# Patient Record
Sex: FEMALE | Race: WHITE | NOT HISPANIC OR LATINO | Employment: UNEMPLOYED | URBAN - METROPOLITAN AREA
[De-identification: names, ages, dates, MRNs, and addresses within clinical notes are randomized per-mention and may not be internally consistent; named-entity substitution may affect disease eponyms.]

---

## 2020-04-22 ENCOUNTER — TELEPHONE (OUTPATIENT)
Dept: OBSTETRICS AND GYNECOLOGY | Facility: CLINIC | Age: 35
End: 2020-04-22

## 2020-04-22 NOTE — TELEPHONE ENCOUNTER
----- Message from Kayla Bean sent at 4/22/2020  2:37 PM CDT -----  Contact: pt   Pt calling because she want to find out who it was dr. Porter referred her to for ortho. Please give patient a call to discuss. [phone]        Thanks   Kayla Bean

## 2020-04-22 NOTE — TELEPHONE ENCOUNTER
PT C/O OF CARPAL TUNNEL, WANTS TO SEE ORTHO. DOESN'T REMEMBER WHO SHE SAW DURING PREGNANCY FOR THIS ISSUE.  WHO DOES DR ZAPATA RECOMMEND? PLEASE ADVISE

## 2020-05-18 ENCOUNTER — OFFICE VISIT (OUTPATIENT)
Dept: ORTHOPEDICS | Facility: CLINIC | Age: 35
End: 2020-05-18
Payer: COMMERCIAL

## 2020-05-18 VITALS — BODY MASS INDEX: 35.28 KG/M2 | HEIGHT: 60 IN | TEMPERATURE: 98 F | WEIGHT: 179.69 LBS

## 2020-05-18 DIAGNOSIS — M72.2 PLANTAR FASCIITIS OF RIGHT FOOT: ICD-10-CM

## 2020-05-18 DIAGNOSIS — G56.01 RIGHT CARPAL TUNNEL SYNDROME: Primary | ICD-10-CM

## 2020-05-18 PROCEDURE — 20526 CARPAL TUNNEL: R RADIOCARPAL: ICD-10-PCS | Mod: 59,RT,S$GLB, | Performed by: ORTHOPAEDIC SURGERY

## 2020-05-18 PROCEDURE — 20526 THER INJECTION CARP TUNNEL: CPT | Mod: 59,RT,S$GLB, | Performed by: ORTHOPAEDIC SURGERY

## 2020-05-18 PROCEDURE — 99203 OFFICE O/P NEW LOW 30 MIN: CPT | Mod: 25,S$GLB,, | Performed by: ORTHOPAEDIC SURGERY

## 2020-05-18 PROCEDURE — 20551 NJX 1 TENDON ORIGIN/INSJ: CPT | Mod: 51,RT,S$GLB, | Performed by: ORTHOPAEDIC SURGERY

## 2020-05-18 PROCEDURE — 99203 PR OFFICE/OUTPT VISIT, NEW, LEVL III, 30-44 MIN: ICD-10-PCS | Mod: 25,S$GLB,, | Performed by: ORTHOPAEDIC SURGERY

## 2020-05-18 PROCEDURE — 20551 TENDON ORIGIN: ICD-10-PCS | Mod: 51,RT,S$GLB, | Performed by: ORTHOPAEDIC SURGERY

## 2020-05-18 RX ORDER — CLONAZEPAM 1 MG/1
TABLET ORAL
COMMUNITY
Start: 2020-04-22

## 2020-05-18 NOTE — PROGRESS NOTES
Subjective:      Patient ID: Kayla New is a 34 y.o. female.    Chief Complaint: Pain of the Right Hand and Pain of the Right Foot    HPI 34-year-old lady who comes in with numbness in the median nerve distribution of the right hand that comes and goes.  She wears a splint at night.  The carpal tunnel was injected about 4 years ago during a pregnancy.    Secondary complaint is of right heel pain which has been going on over a year.  She has been injected once in the past and wears custom orthotics    Review of Systems   Constitution: Negative for fever and weight loss.   Cardiovascular: Negative for chest pain and leg swelling.   Musculoskeletal: Negative for arthritis, joint pain, joint swelling, muscle weakness and stiffness.   Gastrointestinal: Negative for change in bowel habit.   Genitourinary: Negative for bladder incontinence and hematuria.   Neurological: Positive for numbness, paresthesias and sensory change. Negative for focal weakness.         Objective:      Examination of the right hand shows active and passive range of motion of the right wrist is normal.  She has decreased sensation in the median nerve distribution.  She has a positive carpal compression test.    Examination of the right foot shows tenderness of the medial tuberosity of the os calcis and the plantar fascia insertion.  She has a negative Tinel's over the posterior tibial nerve      Ortho/SPM Exam            Assessment:       Encounter Diagnoses   Name Primary?    Right carpal tunnel syndrome Yes    Plantar fasciitis of right foot           Plan:       Kayla was seen today for pain and pain.    Diagnoses and all orders for this visit:    Right carpal tunnel syndrome    Plantar fasciitis of right foot     The right carpal tunnel and plantar fascia insertion are both injected today.  Return p.r.n.

## 2020-12-29 ENCOUNTER — OFFICE VISIT (OUTPATIENT)
Dept: ORTHOPEDICS | Facility: CLINIC | Age: 35
End: 2020-12-29
Payer: COMMERCIAL

## 2020-12-29 DIAGNOSIS — M72.2 PLANTAR FASCIITIS OF RIGHT FOOT: Primary | ICD-10-CM

## 2020-12-29 PROCEDURE — 99212 PR OFFICE/OUTPT VISIT, EST, LEVL II, 10-19 MIN: ICD-10-PCS | Mod: 25,S$GLB,, | Performed by: ORTHOPAEDIC SURGERY

## 2020-12-29 PROCEDURE — 20551 TENDON ORIGIN: ICD-10-PCS | Mod: RT,S$GLB,, | Performed by: ORTHOPAEDIC SURGERY

## 2020-12-29 PROCEDURE — 20551 NJX 1 TENDON ORIGIN/INSJ: CPT | Mod: RT,S$GLB,, | Performed by: ORTHOPAEDIC SURGERY

## 2020-12-29 PROCEDURE — 99212 OFFICE O/P EST SF 10 MIN: CPT | Mod: 25,S$GLB,, | Performed by: ORTHOPAEDIC SURGERY

## 2020-12-29 RX ORDER — SODIUM, POTASSIUM,MAG SULFATES 17.5-3.13G
SOLUTION, RECONSTITUTED, ORAL ORAL
COMMUNITY
Start: 2020-12-22 | End: 2022-06-22

## 2020-12-29 NOTE — PROCEDURES
Tendon Origin    Date/Time: 12/29/2020 1:15 PM  Performed by: Tom Abbasi MD  Authorized by: Tom Abbasi MD     Consent Done?:  Yes (Verbal)  Location: Right plantar fascia insertion.  Prep: patient was prepped and draped in usual sterile fashion    Needle size:  25 G  Approach:  Anteromedial  Medications:  10 mg triamcinolone acetonide 10 mg/mL  Patient tolerance:  Patient tolerated the procedure well with no immediate complications

## 2020-12-29 NOTE — PROGRESS NOTES
Subjective:      Patient ID: Kayla New is a 35 y.o. female.    Chief Complaint: Pain of the Right Foot    HPI 35-year-old lady comes in with right heel pain.  She had previously been seen for plantar fasciitis.    Review of Systems   Constitution: Negative for fever and weight loss.   Cardiovascular: Negative for chest pain and leg swelling.   Musculoskeletal: Negative for arthritis, joint pain, joint swelling, muscle weakness and stiffness.   Gastrointestinal: Negative for change in bowel habit.   Genitourinary: Negative for bladder incontinence and hematuria.   Neurological: Negative for focal weakness, numbness, paresthesias and sensory change.         Objective:      Active and passive range of motion of the right foot and ankle is normal.  She has normal sensation.  She has a negative Tinel's over the posterior tibial nerve.  She is tender with palpation of the medial tuberosity of the os calcis and the plantar fascia insertion.      Ortho/SPM Exam            Assessment:       Encounter Diagnosis   Name Primary?    Plantar fasciitis of right foot Yes          Plan:       Kayla was seen today for pain.    Diagnoses and all orders for this visit:    Plantar fasciitis of right foot      Plantar fascia insertion is injected today.  Return p.r.n.

## 2021-04-20 ENCOUNTER — OFFICE VISIT (OUTPATIENT)
Dept: OBSTETRICS AND GYNECOLOGY | Facility: CLINIC | Age: 36
End: 2021-04-20
Payer: COMMERCIAL

## 2021-04-20 VITALS
HEART RATE: 81 BPM | BODY MASS INDEX: 37.3 KG/M2 | WEIGHT: 191 LBS | SYSTOLIC BLOOD PRESSURE: 138 MMHG | DIASTOLIC BLOOD PRESSURE: 88 MMHG

## 2021-04-20 DIAGNOSIS — N85.2 ENLARGED UTERUS: ICD-10-CM

## 2021-04-20 DIAGNOSIS — Z01.419 WELL WOMAN EXAM WITH ROUTINE GYNECOLOGICAL EXAM: Primary | ICD-10-CM

## 2021-04-20 PROCEDURE — 3008F BODY MASS INDEX DOCD: CPT | Mod: CPTII,S$GLB,, | Performed by: OBSTETRICS & GYNECOLOGY

## 2021-04-20 PROCEDURE — 99395 PR PREVENTIVE VISIT,EST,18-39: ICD-10-PCS | Mod: S$GLB,,, | Performed by: OBSTETRICS & GYNECOLOGY

## 2021-04-20 PROCEDURE — 3008F PR BODY MASS INDEX (BMI) DOCUMENTED: ICD-10-PCS | Mod: CPTII,S$GLB,, | Performed by: OBSTETRICS & GYNECOLOGY

## 2021-04-20 PROCEDURE — 99395 PREV VISIT EST AGE 18-39: CPT | Mod: S$GLB,,, | Performed by: OBSTETRICS & GYNECOLOGY

## 2021-04-20 RX ORDER — BREMELANOTIDE 1.75 MG/.3ML
1 INJECTION SUBCUTANEOUS DAILY PRN
Qty: 4 SYRINGE | Refills: 6 | Status: SHIPPED | OUTPATIENT
Start: 2021-04-20 | End: 2021-04-20 | Stop reason: SDUPTHER

## 2021-04-20 RX ORDER — BREMELANOTIDE 1.75 MG/.3ML
1 INJECTION SUBCUTANEOUS DAILY PRN
Qty: 4 SYRINGE | Refills: 6 | Status: SHIPPED | OUTPATIENT
Start: 2021-04-20 | End: 2022-06-22

## 2021-04-20 RX ORDER — BUSPIRONE HYDROCHLORIDE 10 MG/1
10 TABLET ORAL 2 TIMES DAILY
COMMUNITY
Start: 2021-03-18 | End: 2022-06-22

## 2021-04-23 LAB
CHLAMYDIA: NEGATIVE
GONORRHEA: NEGATIVE
SOURCE: NORMAL

## 2021-04-26 ENCOUNTER — PROCEDURE VISIT (OUTPATIENT)
Dept: OBSTETRICS AND GYNECOLOGY | Facility: CLINIC | Age: 36
End: 2021-04-26
Payer: COMMERCIAL

## 2021-04-26 DIAGNOSIS — N85.2 ENLARGED UTERUS: ICD-10-CM

## 2021-04-26 PROCEDURE — 76830 TRANSVAGINAL US NON-OB: CPT | Mod: S$GLB,,, | Performed by: OBSTETRICS & GYNECOLOGY

## 2021-04-26 PROCEDURE — 76830 PR  ECHOGRAPHY,TRANSVAGINAL: ICD-10-PCS | Mod: S$GLB,,, | Performed by: OBSTETRICS & GYNECOLOGY

## 2021-05-12 ENCOUNTER — PATIENT MESSAGE (OUTPATIENT)
Dept: RESEARCH | Facility: HOSPITAL | Age: 36
End: 2021-05-12

## 2022-04-25 ENCOUNTER — OFFICE VISIT (OUTPATIENT)
Dept: OBSTETRICS AND GYNECOLOGY | Facility: CLINIC | Age: 37
End: 2022-04-25
Payer: COMMERCIAL

## 2022-04-25 VITALS
HEART RATE: 88 BPM | BODY MASS INDEX: 39.65 KG/M2 | SYSTOLIC BLOOD PRESSURE: 143 MMHG | WEIGHT: 203 LBS | DIASTOLIC BLOOD PRESSURE: 88 MMHG

## 2022-04-25 DIAGNOSIS — N92.0 MENORRHAGIA WITH REGULAR CYCLE: ICD-10-CM

## 2022-04-25 DIAGNOSIS — Z31.69 INFERTILITY COUNSELING: ICD-10-CM

## 2022-04-25 DIAGNOSIS — N94.6 DYSMENORRHEA: ICD-10-CM

## 2022-04-25 DIAGNOSIS — Z01.419 WELL WOMAN EXAM WITH ROUTINE GYNECOLOGICAL EXAM: Primary | ICD-10-CM

## 2022-04-25 PROCEDURE — 3077F PR MOST RECENT SYSTOLIC BLOOD PRESSURE >= 140 MM HG: ICD-10-PCS | Mod: CPTII,S$GLB,, | Performed by: OBSTETRICS & GYNECOLOGY

## 2022-04-25 PROCEDURE — 3008F PR BODY MASS INDEX (BMI) DOCUMENTED: ICD-10-PCS | Mod: CPTII,S$GLB,, | Performed by: OBSTETRICS & GYNECOLOGY

## 2022-04-25 PROCEDURE — 3079F PR MOST RECENT DIASTOLIC BLOOD PRESSURE 80-89 MM HG: ICD-10-PCS | Mod: CPTII,S$GLB,, | Performed by: OBSTETRICS & GYNECOLOGY

## 2022-04-25 PROCEDURE — 3008F BODY MASS INDEX DOCD: CPT | Mod: CPTII,S$GLB,, | Performed by: OBSTETRICS & GYNECOLOGY

## 2022-04-25 PROCEDURE — 1159F PR MEDICATION LIST DOCUMENTED IN MEDICAL RECORD: ICD-10-PCS | Mod: CPTII,S$GLB,, | Performed by: OBSTETRICS & GYNECOLOGY

## 2022-04-25 PROCEDURE — 99395 PREV VISIT EST AGE 18-39: CPT | Mod: S$GLB,,, | Performed by: OBSTETRICS & GYNECOLOGY

## 2022-04-25 PROCEDURE — 3079F DIAST BP 80-89 MM HG: CPT | Mod: CPTII,S$GLB,, | Performed by: OBSTETRICS & GYNECOLOGY

## 2022-04-25 PROCEDURE — 1159F MED LIST DOCD IN RCRD: CPT | Mod: CPTII,S$GLB,, | Performed by: OBSTETRICS & GYNECOLOGY

## 2022-04-25 PROCEDURE — 99395 PR PREVENTIVE VISIT,EST,18-39: ICD-10-PCS | Mod: S$GLB,,, | Performed by: OBSTETRICS & GYNECOLOGY

## 2022-04-25 PROCEDURE — 3077F SYST BP >= 140 MM HG: CPT | Mod: CPTII,S$GLB,, | Performed by: OBSTETRICS & GYNECOLOGY

## 2022-04-25 NOTE — PROGRESS NOTES
Subjective:       Patient ID: Kayla New is a 36 y.o. female.    Chief Complaint:  Well Woman (PT IS INTERESTED IN CONCEIVING, STATES HER AND SPOUSE HAVE BEEN TRYING FOR A WHILE WITH NO RESULTS. )      History of Present Illness  She is doing well.  No new health issues,  No abnormal bleeding, No GI or Gu concerns,  No dyspareunia  Her oldest child is 14 the youngest is 5.    They are interested in getting pregnant.    They have not been using protection for some time.  They have been been open to pregnancy.   She appears to be ovulating based on her  Osiel .  She has had a hx of menorrhagia and dysmenorrhea.   She is aware that anything she does to improve her menses will prevent pregnancy.  She has had pelvic pain in the past and move forward with hyst if sx persist and pregnancy does not occur  HPI      GYN & OB History  Patient's last menstrual period was 2022.     Date of Last Pap: No result found    OB History    Para Term  AB Living   2 1           SAB IAB Ectopic Multiple Live Births                  # Outcome Date GA Lbr Alex/2nd Weight Sex Delivery Anes PTL Lv   2             1 Para                Review of Systems  Review of Systems   Constitutional: Negative for activity change.   Eyes: Negative for visual disturbance.   Respiratory: Negative for shortness of breath.    Cardiovascular: Negative for chest pain.   Gastrointestinal: Negative for abdominal pain.   Genitourinary: Negative for vaginal bleeding.        No abnormal vaginal bleeding   Musculoskeletal: Negative for back pain.   Integumentary:  Negative for rash and breast mass.   Neurological: Negative for numbness.   Psychiatric/Behavioral:        No mood disturbance or changes    Breast: Negative for mass            Objective:    Physical Exam:   Constitutional: She is oriented to person, place, and time. She appears well-developed. She is cooperative.    HENT:   Head: Normocephalic.     Neck: Trachea normal. No  thyromegaly present.    Cardiovascular: Normal rate, regular rhythm and normal heart sounds.     Pulmonary/Chest: Effort normal and breath sounds normal. Right breast exhibits no mass, no nipple discharge and no skin change. Left breast exhibits no mass, no nipple discharge and no skin change.        Abdominal: Soft. There is no abdominal tenderness. There is no rebound and no guarding.   prob umbilical hernia with weakness  Noted on valsalva     Genitourinary:    Vagina and uterus normal.      Pelvic exam was performed with patient supine.   Labial bartholins normal.There is no lesion on the right labia. There is no lesion on the left labia. Cervix is normal. Right adnexum displays no mass and no tenderness. Left adnexum displays no mass and no tenderness. Cervix exhibits no discharge. Also,  pap smear completed  Uterus is not enlarged and not tender.              Lymphadenopathy:        Head (right side): No submental and no submandibular adenopathy present.        Head (left side): No submental and no submandibular adenopathy present.     She has no cervical adenopathy.    Neurological: She is alert and oriented to person, place, and time.    Skin: Skin is warm.    Psychiatric: She has a normal mood and affect. Her speech is normal and behavior is normal. Thought content normal.          Assessment:        1. Well woman exam with routine gynecological exam    2. Infertility counseling    3. Dysmenorrhea    4. Menorrhagia with regular cycle                 Plan:           [facility]  [facility]   Semen analysis and consider Storment and olmstead   Long discussion with her regarding risk of genetic abnormalities     Pap discussed will return for her annual   If she becomes confident she is not interested in pregnancy than she would be a good candidate for a TLH  She could do a hernia repair at the same time  Pt is aware we call all results. If she does not hear from our office regarding her result within a week of having a study  or procedure performed she is to call the office so that we can research the result for her.  Birth control discussed  Chaperone was present

## 2022-04-30 LAB — ANTI-MULLERIAN HORMONE (AMH), FEMALE: 0.39 NG/ML (ref 0.18–5.68)

## 2022-06-13 ENCOUNTER — TELEPHONE (OUTPATIENT)
Dept: OBSTETRICS AND GYNECOLOGY | Facility: CLINIC | Age: 37
End: 2022-06-13
Payer: COMMERCIAL

## 2022-06-13 NOTE — TELEPHONE ENCOUNTER
----- Message from Kimmie Pro sent at 6/13/2022  1:59 PM CDT -----  Contact: Patient  Patient called to consult with nurse or staff regarding an issue she talked with Dr. Porter previously about a hernia repair and hysterectomy. She would like a call back and can be reached at [phone]. Thanks/MR

## 2022-06-13 NOTE — TELEPHONE ENCOUNTER
Phone message returned spoke with patient stating she spoke with Dr Porter at her last appointment regarding a hysto. Pt requesting to know if she can proceed with hysto and do a hernia repair as well. Message forwarded to Provider office to address.

## 2022-06-14 DIAGNOSIS — Z76.89 ENCOUNTER TO ESTABLISH CARE: Primary | ICD-10-CM

## 2022-06-14 NOTE — TELEPHONE ENCOUNTER
New order noted from Dr Porter to consult Dr Zaragoza office for patient to establish care and to corollate hernia repair with hysto. Referral sent and pt notified of above. Acknowledged understanding.

## 2022-06-15 ENCOUNTER — PATIENT MESSAGE (OUTPATIENT)
Dept: OBSTETRICS AND GYNECOLOGY | Facility: CLINIC | Age: 37
End: 2022-06-15
Payer: COMMERCIAL

## 2022-06-22 ENCOUNTER — OFFICE VISIT (OUTPATIENT)
Dept: SURGERY | Facility: CLINIC | Age: 37
End: 2022-06-22
Payer: COMMERCIAL

## 2022-06-22 DIAGNOSIS — K42.9 UMBILICAL HERNIA WITHOUT OBSTRUCTION AND WITHOUT GANGRENE: Primary | ICD-10-CM

## 2022-06-22 PROCEDURE — 99203 PR OFFICE/OUTPT VISIT, NEW, LEVL III, 30-44 MIN: ICD-10-PCS | Mod: S$GLB,,, | Performed by: SURGERY

## 2022-06-22 PROCEDURE — 99203 OFFICE O/P NEW LOW 30 MIN: CPT | Mod: S$GLB,,, | Performed by: SURGERY

## 2022-06-22 NOTE — PROGRESS NOTES
History & Physical    SUBJECTIVE:     History of Present Illness:    36-year-old female referred by Dr. Keith kaba for small umbilical hernia that is symptomatic.  Patient states she has had a hernia present for many years but recently was doing some increased activity and started to have discomfort in the area of the umbilical hernia.  She is also a power weight .  No history of incarceration or obstruction.  She is also considering getting pregnant in the near future.    Chief Complaint   Patient presents with    Hernia         Review of patient's allergies indicates:  Review of patient's allergies indicates:   Allergen Reactions    Erythromycin      CAUSES YEAST INFECTIONS     Fluconazole      RASH       Current Outpatient Medications on File Prior to Visit   Medication Sig Dispense Refill    clonazePAM (KLONOPIN) 1 MG tablet       flu vac [id number] 36mos up,PF, 60 mcg (15 mcg x 4)/0.5 mL Syrg Fluzone Quad 2018-19(PF) 60 mcg(15 mcgx4)/0.5 mL intramuscular syringe      [DISCONTINUED] bremelanotide (VYLEESI) 1.75 mg/0.3 mL AtIn Inject 1 Dose into the skin daily as needed (inject subcutaneously as needed at least 45 minutes befoer anticipated sexual activity. No more then 1 dose per 24 hours and no more than 8 doses per month.). 4 Syringe 6    [DISCONTINUED] busPIRone (BUSPAR) 10 MG tablet Take 10 mg by mouth 2 (two) times daily. as needed for anxiety.      [DISCONTINUED] SUPREP BOWEL PREP KIT 17.5-3.13-1.6 gram SolR TAKE AS INSTRUCTED BY PHYSICIAN S OFFICE NO FOOD THE ENTIRE DAY PRIOR TO COLONOSCOPY TAKE SECOND DOSE AT LEAST 4 HOURS BEFORE COLONOSCOPY       No current facility-administered medications on file prior to visit.       Past Medical History:   Diagnosis Date    Anxiety     Current smoker     Depression     Enlarged uterus     Fifth disease     History of chickenpox     Insomnia     Plantar fasciitis of right foot     Rectocele, female     Right carpal tunnel syndrome     Sexual  desire disorder     Umbilical hernia     Weight gain      Past Surgical History:   Procedure Laterality Date     SECTION      X2    WISDOM TOOTH EXTRACTION       Family History   Problem Relation Age of Onset    Prostate cancer Maternal Grandfather     Heart disease Maternal Grandfather     Colon cancer Maternal Grandfather     Stroke Paternal Grandfather     Heart disease Paternal Grandfather     No Known Problems Mother     No Known Problems Father     No Known Problems Sister     No Known Problems Brother     No Known Problems Maternal Grandmother     Rheum arthritis Paternal Grandmother        Social History     Socioeconomic History    Marital status:    Tobacco Use    Smoking status: Current Some Day Smoker     Years: 2.00     Types: Vaping with nicotine    Smokeless tobacco: Never Used   Substance and Sexual Activity    Alcohol use: Yes     Comment: I have couple drinks a month at most    Drug use: Never    Sexual activity: Yes     Partners: Male     Birth control/protection: None     Comment: Trying to get pregnant          Review of Systems   Constitutional: Negative.    Respiratory: Negative.    Cardiovascular: Negative.    Gastrointestinal: Negative.    Genitourinary: Negative.    Musculoskeletal: Negative.    Neurological: Negative.        OBJECTIVE:     There were no vitals filed for this visit.              Physical Exam:  Physical Exam  Constitutional:       Appearance: Normal appearance.   HENT:      Head: Normocephalic and atraumatic.   Eyes:      Pupils: Pupils are equal, round, and reactive to light.   Cardiovascular:      Rate and Rhythm: Regular rhythm.      Heart sounds: Normal heart sounds.   Pulmonary:      Breath sounds: Normal breath sounds.   Abdominal:      General: Abdomen is flat. Bowel sounds are normal.      Palpations: Abdomen is soft.      Hernia: A hernia (Small umbilical hernia noted with incarcerated preperitoneal fat, mild tenderness) is  present.   Musculoskeletal:         General: No swelling. Normal range of motion.      Cervical back: Normal range of motion.   Skin:     General: Skin is warm and dry.      Coloration: Skin is not jaundiced.   Neurological:      General: No focal deficit present.      Mental Status: She is alert and oriented to person, place, and time.      Cranial Nerves: No cranial nerve deficit.   Psychiatric:         Mood and Affect: Mood normal.         Judgment: Judgment normal.             ASSESSMENT/PLAN:   Umbilical hernia, symptomatic  PLAN:  Discussed with patient umbilical herniorrhaphy with or without mesh placement.  The procedure, risk and benefits as well as expected postoperative course were discussed with patient.  Surgery scheduled for July 19, 2022

## 2022-06-28 VITALS — WEIGHT: 200 LBS | BODY MASS INDEX: 39.27 KG/M2 | HEIGHT: 60 IN

## 2022-06-28 DIAGNOSIS — Z86.010 HISTORY OF COLON POLYPS: ICD-10-CM

## 2022-06-28 DIAGNOSIS — K62.5 RECTAL BLEEDING: Primary | ICD-10-CM

## 2022-06-28 RX ORDER — BUPROPION HYDROCHLORIDE 150 MG/1
150 TABLET ORAL EVERY MORNING
COMMUNITY
Start: 2022-06-23

## 2022-06-28 NOTE — TELEPHONE ENCOUNTER
Hernia repair ( umbilical) 7/19/2022. Updated chart with patient. She takes edibles every night before bed. Ready to schedule her non urgent colonoscopy-LKL

## 2022-06-29 RX ORDER — SOD SULF/POT CHLORIDE/MAG SULF 1.479 G
12 TABLET ORAL DAILY
Qty: 24 TABLET | Refills: 0 | Status: SHIPPED | OUTPATIENT
Start: 2022-06-29 | End: 2023-05-01

## 2022-06-30 ENCOUNTER — TELEPHONE (OUTPATIENT)
Dept: GASTROENTEROLOGY | Facility: CLINIC | Age: 37
End: 2022-06-30
Payer: COMMERCIAL

## 2022-06-30 DIAGNOSIS — K62.5 RECTAL BLEEDING: Primary | ICD-10-CM

## 2022-06-30 DIAGNOSIS — Z86.010 HISTORY OF COLON POLYPS: ICD-10-CM

## 2022-06-30 NOTE — TELEPHONE ENCOUNTER
[facility]  [address] [place] [address]  Phone: [phone]  Fax: [phone]    History & Physical         Provider: Dr. Caty Huitron    Patient Name: Kayla ROLLE (age):1985  36 y.o.           Gender: female   Phone: [phone]     Referring Physician: Pearl Santa     Vital Signs:   Height - 5'  Weight - 200 lb  BMI -  39.06    Plan: Colonoscopy     Encounter Diagnoses   Name Primary?    Rectal bleeding Yes    History of colon polyps            History:      Past Medical History:   Diagnosis Date    Anxiety     BMI 39.0-39.9,adult     Current smoker     Depression     Enlarged uterus     Fifth disease     History of chickenpox     Insomnia     Plantar fasciitis of right foot     Rectocele, female     Right carpal tunnel syndrome     Sexual desire disorder     Umbilical hernia     Weight gain       Past Surgical History:   Procedure Laterality Date     SECTION      X2    WISDOM TOOTH EXTRACTION        Medication List with Changes/Refills   Current Medications    BUPROPION (WELLBUTRIN XL) 150 MG TB24 TABLET    Take 150 mg by mouth every morning.    CLONAZEPAM (KLONOPIN) 1 MG TABLET        FLU VAC [id number] 36MOS UP,PF, 60 MCG (15 MCG X 4)/0.5 ML SYRG    Fluzone Quad (PF) 60 mcg(15 mcgx4)/0.5 mL intramuscular syringe    SOD SULF-POT CHLORIDE-MAG SULF (SUTAB) 1.479-0.188- 0.225 GRAM TABLET    Take 12 tablets by mouth once daily. Take according to package instructions with indicated amount of water. No breakfast day before test.      Review of patient's allergies indicates:   Allergen Reactions    Erythromycin      CAUSES YEAST INFECTIONS     Fluconazole      RASH      Family History   Problem Relation Age of Onset    Prostate cancer Maternal Grandfather     Heart disease Maternal Grandfather     Colon cancer Maternal Grandfather     Stroke  Paternal Grandfather     Heart disease Paternal Grandfather     No Known Problems Mother     No Known Problems Father     No Known Problems Sister     No Known Problems Brother     No Known Problems Maternal Grandmother     Rheum arthritis Paternal Grandmother       Social History     Tobacco Use    Smoking status: Current Some Day Smoker     Years: 2.00     Types: Vaping with nicotine    Smokeless tobacco: Never Used   Substance Use Topics    Alcohol use: Yes     Comment: I have couple drinks a month at most    Drug use: Yes     Types: Marijuana        Physical Examination:     General Appearance:___________________________  HEENT: _____________________________________  Abdomen:____________________________________  Heart:________________________________________  Lungs:_______________________________________  Extremities:___________________________________  Skin:_________________________________________  Endocrine:____________________________________  Genitourinary:_________________________________  Neurological:__________________________________      Patient has been evaluated immediately prior to sedation and is medically cleared for endoscopy with IVCS as an ASA class: ______      Physician Signature: _________________________       Date: ________  Time: ________

## 2022-07-08 LAB
ANION GAP SERPL CALC-SCNC: 7 MMOL/L (ref 3–11)
BASOPHILS NFR BLD: 0.5 % (ref 0–3)
BUN SERPL-MCNC: 14 MG/DL (ref 7–18)
BUN/CREAT SERPL: 13.86 RATIO (ref 7–18)
CALCIUM SERPL-MCNC: 9.2 MG/DL (ref 8.8–10.5)
CHLORIDE SERPL-SCNC: 103 MMOL/L (ref 100–108)
CO2 SERPL-SCNC: 27 MMOL/L (ref 21–32)
CREAT SERPL-MCNC: 1.01 MG/DL (ref 0.55–1.02)
EOSINOPHIL NFR BLD: 1.2 % (ref 1–3)
ERYTHROCYTE [DISTWIDTH] IN BLOOD BY AUTOMATED COUNT: 13 % (ref 12.5–18)
GFR ESTIMATION: > 60
GLUCOSE SERPL-MCNC: 115 MG/DL (ref 70–110)
HCG QUALITATIVE: NEGATIVE
HCT VFR BLD AUTO: 37.9 % (ref 37–47)
HGB BLD-MCNC: 12.4 G/DL (ref 12–16)
LYMPHOCYTES NFR BLD: 28.3 % (ref 25–40)
MCH RBC QN AUTO: 29.1 PG (ref 27–31.2)
MCHC RBC AUTO-ENTMCNC: 32.7 G/DL (ref 31.8–35.4)
MCV RBC AUTO: 89 FL (ref 80–97)
MONOCYTES NFR BLD: 7.3 % (ref 1–15)
NEUTROPHILS # BLD AUTO: 4.55 10*3/UL (ref 1.8–7.7)
NEUTROPHILS NFR BLD: 62.3 % (ref 37–80)
NUCLEATED RED BLOOD CELLS: 0 %
PLATELETS: 321 10*3/UL (ref 142–424)
POTASSIUM SERPL-SCNC: 4.9 MMOL/L (ref 3.6–5.2)
RBC # BLD AUTO: 4.26 10*6/UL (ref 4.2–5.4)
SODIUM BLD-SCNC: 137 MMOL/L (ref 135–145)
WBC # BLD: 7.3 10*3/UL (ref 4.6–10.2)

## 2022-07-19 ENCOUNTER — OUTSIDE PLACE OF SERVICE (OUTPATIENT)
Dept: ADMINISTRATIVE | Facility: OTHER | Age: 37
End: 2022-07-19
Payer: COMMERCIAL

## 2022-07-19 PROCEDURE — 49587 PR REPAIR UMBILICAL HERN,5+Y/O,STRANG: CPT | Mod: ,,, | Performed by: SURGERY

## 2022-07-19 PROCEDURE — 49587 PR REPAIR UMBILICAL HERN,5+Y/O,STRANG: ICD-10-PCS | Mod: ,,, | Performed by: SURGERY

## 2022-07-19 NOTE — TELEPHONE ENCOUNTER
----- Message from Marielle Kinsey sent at 7/19/2022 11:53 AM CDT -----  Regarding: Medication  Contact: patient  Per phone call with patient, she stated that she would like for Zofran to be called into the pharmacy for her.  Please return call at [phone] (home).        [facility]/pharmacy #[id number] - [place], [place] - [address] [address]  Phone: [phone] Fax: [phone]        LAURA Choe

## 2022-07-20 LAB — SPECIMEN TO PATHOLOGY: NORMAL

## 2022-07-20 RX ORDER — ONDANSETRON 4 MG/1
4 TABLET, FILM COATED ORAL EVERY 6 HOURS PRN
Qty: 12 TABLET | Refills: 0 | Status: SHIPPED | OUTPATIENT
Start: 2022-07-20 | End: 2023-05-01

## 2022-07-22 ENCOUNTER — PATIENT MESSAGE (OUTPATIENT)
Dept: SURGERY | Facility: CLINIC | Age: 37
End: 2022-07-22
Payer: COMMERCIAL

## 2022-07-27 ENCOUNTER — OFFICE VISIT (OUTPATIENT)
Dept: SURGERY | Facility: CLINIC | Age: 37
End: 2022-07-27
Payer: COMMERCIAL

## 2022-07-27 DIAGNOSIS — Z98.890 POST-OPERATIVE STATE: Primary | ICD-10-CM

## 2022-07-27 PROCEDURE — 1160F RVW MEDS BY RX/DR IN RCRD: CPT | Mod: CPTII,S$GLB,, | Performed by: SURGERY

## 2022-07-27 PROCEDURE — 1160F PR REVIEW ALL MEDS BY PRESCRIBER/CLIN PHARMACIST DOCUMENTED: ICD-10-PCS | Mod: CPTII,S$GLB,, | Performed by: SURGERY

## 2022-07-27 PROCEDURE — 1159F PR MEDICATION LIST DOCUMENTED IN MEDICAL RECORD: ICD-10-PCS | Mod: CPTII,S$GLB,, | Performed by: SURGERY

## 2022-07-27 PROCEDURE — 99024 PR POST-OP FOLLOW-UP VISIT: ICD-10-PCS | Mod: S$GLB,,, | Performed by: SURGERY

## 2022-07-27 PROCEDURE — 1159F MED LIST DOCD IN RCRD: CPT | Mod: CPTII,S$GLB,, | Performed by: SURGERY

## 2022-07-27 PROCEDURE — 99024 POSTOP FOLLOW-UP VISIT: CPT | Mod: S$GLB,,, | Performed by: SURGERY

## 2022-07-27 NOTE — PROGRESS NOTES
HPI:  Postop revisit status post umbilical hernia repair.  Doing well    PHYSICAL EXAM:  Incision is clean and dry with no redness or drainage noted.  Subcuticular suture removed.  Steri-Strips remain intact  ASSESSMENT:   Stable status post umbilical hernia repair   PLAN:    Revisit as needed.  Advised no heavy lifting greater than 20 lb for 6 weeks.

## 2022-09-01 RX ORDER — SOD SULF/POT CHLORIDE/MAG SULF 1.479 G
12 TABLET ORAL DAILY
Qty: 24 TABLET | Refills: 0 | Status: SHIPPED | OUTPATIENT
Start: 2022-09-01 | End: 2023-05-01

## 2022-09-08 ENCOUNTER — OUTSIDE PLACE OF SERVICE (OUTPATIENT)
Dept: GASTROENTEROLOGY | Facility: CLINIC | Age: 37
End: 2022-09-08

## 2022-09-08 PROCEDURE — 45378 PR COLONOSCOPY,DIAGNOSTIC: ICD-10-PCS | Mod: ,,, | Performed by: INTERNAL MEDICINE

## 2022-09-08 PROCEDURE — 45378 DIAGNOSTIC COLONOSCOPY: CPT | Mod: ,,, | Performed by: INTERNAL MEDICINE

## 2022-09-13 ENCOUNTER — PATIENT MESSAGE (OUTPATIENT)
Dept: OBSTETRICS AND GYNECOLOGY | Facility: CLINIC | Age: 37
End: 2022-09-13
Payer: COMMERCIAL

## 2022-09-13 DIAGNOSIS — Z31.9 PATIENT DESIRES PREGNANCY: Primary | ICD-10-CM

## 2022-10-17 ENCOUNTER — OFFICE VISIT (OUTPATIENT)
Dept: ORTHOPEDICS | Facility: CLINIC | Age: 37
End: 2022-10-17
Payer: COMMERCIAL

## 2022-10-17 DIAGNOSIS — G56.01 RIGHT CARPAL TUNNEL SYNDROME: ICD-10-CM

## 2022-10-17 DIAGNOSIS — M72.2 PLANTAR FASCIITIS OF RIGHT FOOT: Primary | ICD-10-CM

## 2022-10-17 PROCEDURE — 1159F PR MEDICATION LIST DOCUMENTED IN MEDICAL RECORD: ICD-10-PCS | Mod: CPTII,S$GLB,, | Performed by: ORTHOPAEDIC SURGERY

## 2022-10-17 PROCEDURE — 1160F PR REVIEW ALL MEDS BY PRESCRIBER/CLIN PHARMACIST DOCUMENTED: ICD-10-PCS | Mod: CPTII,S$GLB,, | Performed by: ORTHOPAEDIC SURGERY

## 2022-10-17 PROCEDURE — 20526 CARPAL TUNNEL: ICD-10-PCS | Mod: RT,S$GLB,, | Performed by: ORTHOPAEDIC SURGERY

## 2022-10-17 PROCEDURE — 99213 OFFICE O/P EST LOW 20 MIN: CPT | Mod: 25,S$GLB,, | Performed by: ORTHOPAEDIC SURGERY

## 2022-10-17 PROCEDURE — 1160F RVW MEDS BY RX/DR IN RCRD: CPT | Mod: CPTII,S$GLB,, | Performed by: ORTHOPAEDIC SURGERY

## 2022-10-17 PROCEDURE — 99213 PR OFFICE/OUTPT VISIT, EST, LEVL III, 20-29 MIN: ICD-10-PCS | Mod: 25,S$GLB,, | Performed by: ORTHOPAEDIC SURGERY

## 2022-10-17 PROCEDURE — 20526 THER INJECTION CARP TUNNEL: CPT | Mod: RT,S$GLB,, | Performed by: ORTHOPAEDIC SURGERY

## 2022-10-17 PROCEDURE — 1159F MED LIST DOCD IN RCRD: CPT | Mod: CPTII,S$GLB,, | Performed by: ORTHOPAEDIC SURGERY

## 2022-10-17 RX ORDER — NAPROXEN 500 MG/1
TABLET ORAL
COMMUNITY
Start: 2022-07-11 | End: 2023-05-01

## 2022-10-17 RX ORDER — HYDROCODONE BITARTRATE AND ACETAMINOPHEN 7.5; 325 MG/1; MG/1
TABLET ORAL
COMMUNITY
Start: 2022-07-19 | End: 2023-05-01

## 2022-10-17 RX ORDER — METHOCARBAMOL 500 MG/1
TABLET, FILM COATED ORAL
COMMUNITY
Start: 2022-07-11 | End: 2023-05-01

## 2022-10-17 NOTE — PROCEDURES
Carpal Tunnel    Date/Time: 10/17/2022 2:00 PM  Performed by: Tom Abbasi MD  Authorized by: Tom Abbasi MD     Consent Done?:  Yes (Verbal)  Prep: patient was prepped and draped in usual sterile fashion      Local anesthesia used?: No    Location:  Wrist  Site:  R carpal tunnel  Needle size:  25 G  Approach:  Volar  Medications:  5 mg triamcinolone acetonide 10 mg/mL  Patient tolerance:  Patient tolerated the procedure well with no immediate complications  Tendon Sheath    Date/Time: 10/17/2022 2:00 PM  Performed by: Tom Abbasi MD  Authorized by: Tom Abbasi MD     Consent Done?:  Yes (Verbal)  Prep: patient was prepped and draped in usual sterile fashion      Local anesthesia used?: No    Location: Right plantar fascia insertion.  Needle size:  27 G  Approach:  Medial  Medications:  10 mg triamcinolone acetonide 10 mg/mL  Patient tolerance:  Patient tolerated the procedure well with no immediate complications

## 2022-10-17 NOTE — PROGRESS NOTES
Subjective:      Patient ID: Kayla New is a 37 y.o. female.    Chief Complaint: Pain of the Right Hand and Pain of the Right Foot    HPI 37-year-old lady who previously been seen for plantar fasciitis on the right comes in with recurrence.  She also has symptoms of carpal tunnel syndrome on the right.    Review of Systems   Constitutional: Negative for fever and weight loss.   Cardiovascular:  Negative for chest pain and leg swelling.   Musculoskeletal:  Negative for arthritis, joint pain, joint swelling, muscle weakness and stiffness.   Gastrointestinal:  Negative for change in bowel habit.   Genitourinary:  Negative for bladder incontinence and hematuria.   Neurological:  Positive for numbness, paresthesias and sensory change. Negative for focal weakness.       Objective:      Active and passive range of motion of the right wrist is normal.  She has decreased sensation to light touch in the median nerve distribution.  She has a positive carpal compression test.      Examination of the right foot shows tenderness with palpation of the medial tuberosity of the os calcis and plantar fascia insertion.      Ortho/SPM Exam            Assessment:       Encounter Diagnoses   Name Primary?    Plantar fasciitis of right foot Yes    Right carpal tunnel syndrome           Plan:       Kayla was seen today for pain and pain.    Diagnoses and all orders for this visit:    Plantar fasciitis of right foot    Right carpal tunnel syndrome    Both her injected today.  Return p.r.n.

## 2023-05-01 ENCOUNTER — OFFICE VISIT (OUTPATIENT)
Dept: OBSTETRICS AND GYNECOLOGY | Facility: CLINIC | Age: 38
End: 2023-05-01
Payer: COMMERCIAL

## 2023-05-01 VITALS
BODY MASS INDEX: 38.28 KG/M2 | HEART RATE: 102 BPM | WEIGHT: 196 LBS | SYSTOLIC BLOOD PRESSURE: 153 MMHG | DIASTOLIC BLOOD PRESSURE: 92 MMHG

## 2023-05-01 DIAGNOSIS — Z01.419 WELL WOMAN EXAM WITH ROUTINE GYNECOLOGICAL EXAM: Primary | ICD-10-CM

## 2023-05-01 PROCEDURE — 99395 PR PREVENTIVE VISIT,EST,18-39: ICD-10-PCS | Mod: S$GLB,,, | Performed by: OBSTETRICS & GYNECOLOGY

## 2023-05-01 PROCEDURE — 1159F MED LIST DOCD IN RCRD: CPT | Mod: CPTII,S$GLB,, | Performed by: OBSTETRICS & GYNECOLOGY

## 2023-05-01 PROCEDURE — 3080F DIAST BP >= 90 MM HG: CPT | Mod: CPTII,S$GLB,, | Performed by: OBSTETRICS & GYNECOLOGY

## 2023-05-01 PROCEDURE — 3077F SYST BP >= 140 MM HG: CPT | Mod: CPTII,S$GLB,, | Performed by: OBSTETRICS & GYNECOLOGY

## 2023-05-01 PROCEDURE — 3008F BODY MASS INDEX DOCD: CPT | Mod: CPTII,S$GLB,, | Performed by: OBSTETRICS & GYNECOLOGY

## 2023-05-01 PROCEDURE — 3008F PR BODY MASS INDEX (BMI) DOCUMENTED: ICD-10-PCS | Mod: CPTII,S$GLB,, | Performed by: OBSTETRICS & GYNECOLOGY

## 2023-05-01 PROCEDURE — 1159F PR MEDICATION LIST DOCUMENTED IN MEDICAL RECORD: ICD-10-PCS | Mod: CPTII,S$GLB,, | Performed by: OBSTETRICS & GYNECOLOGY

## 2023-05-01 PROCEDURE — 3077F PR MOST RECENT SYSTOLIC BLOOD PRESSURE >= 140 MM HG: ICD-10-PCS | Mod: CPTII,S$GLB,, | Performed by: OBSTETRICS & GYNECOLOGY

## 2023-05-01 PROCEDURE — 3080F PR MOST RECENT DIASTOLIC BLOOD PRESSURE >= 90 MM HG: ICD-10-PCS | Mod: CPTII,S$GLB,, | Performed by: OBSTETRICS & GYNECOLOGY

## 2023-05-01 PROCEDURE — 99395 PREV VISIT EST AGE 18-39: CPT | Mod: S$GLB,,, | Performed by: OBSTETRICS & GYNECOLOGY

## 2023-05-01 RX ORDER — METFORMIN HYDROCHLORIDE 500 MG/1
500 TABLET, EXTENDED RELEASE ORAL 2 TIMES DAILY
COMMUNITY
Start: 2023-04-03

## 2023-05-01 NOTE — PROGRESS NOTES
Subjective:       Patient ID: Kayla New is a 37 y.o. female.    Chief Complaint:  Well Woman      History of Present Illness  She is doing well.  No new health issues,  No abnormal bleeding, No GI or Gu concerns,  No dyspareunia  She has tried IVF recently   she did not respond to the HRT  to stimulate her ovaries.   She is considering donation.  No meds from me  .   HPI      GYN & OB History  No LMP recorded (lmp unknown).     Date of Last Pap: No result found    OB History    Para Term  AB Living   2 1           SAB IAB Ectopic Multiple Live Births                  # Outcome Date GA Lbr Alex/2nd Weight Sex Delivery Anes PTL Lv   2             1 Para                Review of Systems  Review of Systems   Constitutional:  Negative for activity change.   Eyes:  Negative for visual disturbance.   Respiratory:  Negative for shortness of breath.    Cardiovascular:  Negative for chest pain.   Gastrointestinal:  Negative for abdominal pain.   Genitourinary:  Negative for vaginal bleeding.        No abnormal vaginal bleeding   Musculoskeletal:  Negative for back pain.   Integumentary:  Negative for rash and breast mass.   Neurological:  Negative for numbness.   Psychiatric/Behavioral:          No mood disturbance or changes    Breast: Negative for mass          Objective:    Physical Exam:   Constitutional: She is oriented to person, place, and time. She appears well-developed. She is cooperative.    HENT:   Head: Normocephalic.     Neck: Trachea normal. No thyromegaly present.    Cardiovascular:  Normal rate, regular rhythm and normal heart sounds.             Pulmonary/Chest: Effort normal and breath sounds normal. Right breast exhibits no mass, no nipple discharge and no skin change. Left breast exhibits no mass, no nipple discharge and no skin change.        Abdominal: Soft. There is no abdominal tenderness. There is no rebound and no guarding.     Genitourinary:    Vagina and uterus  normal.      Pelvic exam was performed with patient supine.   Labial bartholins normal.There is no lesion on the right labia. There is no lesion on the left labia. Cervix is normal. Right adnexum displays no mass and no tenderness. Left adnexum displays no mass and no tenderness. Cervix exhibits no discharge. Uterus is not enlarged and not tender.              Lymphadenopathy:        Head (right side): No submental and no submandibular adenopathy present.        Head (left side): No submental and no submandibular adenopathy present.     She has no cervical adenopathy.    Neurological: She is alert and oriented to person, place, and time.    Skin: Skin is warm.    Psychiatric: She has a normal mood and affect. Her speech is normal and behavior is normal. Thought content normal.        Assessment:        1. Well woman exam with routine gynecological exam                 Plan:             No pap this year      discussed will return for her annual     Pt is aware we call all results. If she does not hear from our office regarding her result within a week of having a study or procedure performed she is to call the office so that we can research the result for her.  Birth control discussed  Chaperone was present

## 2024-03-27 ENCOUNTER — PATIENT MESSAGE (OUTPATIENT)
Dept: OBSTETRICS AND GYNECOLOGY | Facility: CLINIC | Age: 39
End: 2024-03-27
Payer: COMMERCIAL

## 2024-05-14 ENCOUNTER — OFFICE VISIT (OUTPATIENT)
Dept: OBSTETRICS AND GYNECOLOGY | Facility: CLINIC | Age: 39
End: 2024-05-14
Payer: COMMERCIAL

## 2024-05-14 VITALS
BODY MASS INDEX: 37.11 KG/M2 | SYSTOLIC BLOOD PRESSURE: 131 MMHG | WEIGHT: 190 LBS | DIASTOLIC BLOOD PRESSURE: 87 MMHG | HEART RATE: 75 BPM

## 2024-05-14 DIAGNOSIS — G43.009 MIGRAINE WITHOUT AURA AND WITHOUT STATUS MIGRAINOSUS, NOT INTRACTABLE: ICD-10-CM

## 2024-05-14 DIAGNOSIS — Z01.419 WELL WOMAN EXAM WITH ROUTINE GYNECOLOGICAL EXAM: Primary | ICD-10-CM

## 2024-05-14 DIAGNOSIS — Z12.31 SCREENING MAMMOGRAM FOR BREAST CANCER: ICD-10-CM

## 2024-05-14 PROCEDURE — 3079F DIAST BP 80-89 MM HG: CPT | Mod: CPTII,S$GLB,, | Performed by: OBSTETRICS & GYNECOLOGY

## 2024-05-14 PROCEDURE — 3075F SYST BP GE 130 - 139MM HG: CPT | Mod: CPTII,S$GLB,, | Performed by: OBSTETRICS & GYNECOLOGY

## 2024-05-14 PROCEDURE — 99395 PREV VISIT EST AGE 18-39: CPT | Mod: 25,S$GLB,, | Performed by: OBSTETRICS & GYNECOLOGY

## 2024-05-14 PROCEDURE — 3008F BODY MASS INDEX DOCD: CPT | Mod: CPTII,S$GLB,, | Performed by: OBSTETRICS & GYNECOLOGY

## 2024-05-14 PROCEDURE — 1159F MED LIST DOCD IN RCRD: CPT | Mod: CPTII,S$GLB,, | Performed by: OBSTETRICS & GYNECOLOGY

## 2024-05-14 PROCEDURE — 99459 PELVIC EXAMINATION: CPT | Mod: S$GLB,,, | Performed by: OBSTETRICS & GYNECOLOGY

## 2024-05-14 NOTE — PROGRESS NOTES
Subjective:       Patient ID: Kayla New is a 38 y.o. female.    Chief Complaint:  Well Woman (PATIENT C/O HAVING IRREGULAR CYCLES, SHE STATES THE LAST FEW MONTHS. PATIENT ALSO C/O HAVING MORE FREQUENT HEADACHES. )      History of Present Illness  She is doing well.  No new health issues,  No abnormal bleeding, No GI or Gu concerns,  No dyspareunia  She is seeing Dr Mcghee and on an embryo adoption program.  She tried IVF but no ovarian benefit. . Her cycles are off at times but she is having cycles once a month   she does have occ  migraines with sexual activity and exercise.   This is new and I have asked her to see Her PCP for a study    HPI      GYN & OB History  Patient's last menstrual period was 2024.     Date of Last Pap: No result found    OB History    Para Term  AB Living   2 1           SAB IAB Ectopic Multiple Live Births                  # Outcome Date GA Lbr Alex/2nd Weight Sex Type Anes PTL Lv   2             1 Para                Review of Systems  Review of Systems   Constitutional:  Negative for activity change.   Eyes:  Negative for visual disturbance.   Respiratory:  Negative for shortness of breath.    Cardiovascular:  Negative for chest pain.   Gastrointestinal:  Negative for abdominal pain.   Genitourinary:  Negative for vaginal bleeding.        No abnormal vaginal bleeding   Musculoskeletal:  Negative for back pain.   Integumentary:  Negative for rash and breast mass.   Neurological:  Negative for numbness.   Psychiatric/Behavioral:          No mood disturbance or changes    Breast: Negative for mass            Objective:    Physical Exam:   Constitutional: She is oriented to person, place, and time. She appears well-developed. She is cooperative.    HENT:   Head: Normocephalic.     Neck: Trachea normal. No thyromegaly present.    Cardiovascular:  Normal rate, regular rhythm and normal heart sounds.             Pulmonary/Chest: Effort normal and breath  sounds normal. Right breast exhibits no mass, no nipple discharge and no skin change. Left breast exhibits no mass, no nipple discharge and no skin change.        Abdominal: Soft. There is no abdominal tenderness. There is no rebound and no guarding.     Genitourinary:    Vagina and uterus normal.      Pelvic exam was performed with patient supine.     Labial bartholins normal.There is no lesion on the right labia. There is no lesion on the left labia. Cervix is normal. Right adnexum displays no mass and no tenderness. Left adnexum displays no mass and no tenderness. Cervix exhibits no discharge. Uterus is not enlarged and not tender.              Lymphadenopathy:        Head (right side): No submental and no submandibular adenopathy present.        Head (left side): No submental and no submandibular adenopathy present.     She has no cervical adenopathy.    Neurological: She is alert and oriented to person, place, and time.    Skin: Skin is warm.    Psychiatric: She has a normal mood and affect. Her speech is normal and behavior is normal. Thought content normal.          Assessment:        1. Well woman exam with routine gynecological exam    2. Migraine without aura and without status migrainosus, not intractable                 Plan:           To see Dr Santa for new onset headaches   Pap not preformed    discussed will return for her annual     Pt is aware we call all results. If she does not hear from our office regarding her result within a week of having a study or procedure performed she is to call the office so that we can research the result for her.  Birth control discussed  Chaperone was present

## 2025-01-24 ENCOUNTER — PATIENT MESSAGE (OUTPATIENT)
Dept: OBSTETRICS AND GYNECOLOGY | Facility: CLINIC | Age: 40
End: 2025-01-24
Payer: COMMERCIAL

## 2025-02-08 ENCOUNTER — PATIENT MESSAGE (OUTPATIENT)
Dept: OBSTETRICS AND GYNECOLOGY | Facility: CLINIC | Age: 40
End: 2025-02-08
Payer: COMMERCIAL

## 2025-03-07 DIAGNOSIS — Z20.2 STD EXPOSURE: Primary | ICD-10-CM

## 2025-03-11 ENCOUNTER — RESULTS FOLLOW-UP (OUTPATIENT)
Dept: OBSTETRICS AND GYNECOLOGY | Facility: CLINIC | Age: 40
End: 2025-03-11

## 2025-03-11 ENCOUNTER — OFFICE VISIT (OUTPATIENT)
Dept: OBSTETRICS AND GYNECOLOGY | Facility: CLINIC | Age: 40
End: 2025-03-11
Payer: COMMERCIAL

## 2025-03-11 VITALS
WEIGHT: 189.63 LBS | BODY MASS INDEX: 37.03 KG/M2 | HEART RATE: 71 BPM | SYSTOLIC BLOOD PRESSURE: 151 MMHG | DIASTOLIC BLOOD PRESSURE: 88 MMHG

## 2025-03-11 DIAGNOSIS — N94.6 DYSMENORRHEA: ICD-10-CM

## 2025-03-11 DIAGNOSIS — N81.6 RECTOCELE: ICD-10-CM

## 2025-03-11 DIAGNOSIS — N92.0 MENORRHAGIA WITH REGULAR CYCLE: ICD-10-CM

## 2025-03-11 DIAGNOSIS — Z01.419 ENCOUNTER FOR WELL WOMAN EXAM WITH ROUTINE GYNECOLOGICAL EXAM: ICD-10-CM

## 2025-03-11 DIAGNOSIS — Z12.4 ENCOUNTER FOR SCREENING FOR CERVICAL CANCER: Primary | ICD-10-CM

## 2025-03-11 NOTE — PROGRESS NOTES
Subjective:       Patient ID: Kayla New is a 39 y.o. female.    Chief Complaint:  Consult (Patient would like to discuss surgery. )      History of Present Illness  She is doing well.  No new health issues,  No abnormal bleeding, she  is splinting with every BM now.  She has a small bladder  no OSCAR   she does not pee when she power lifts  ,  her  left the relationship they are .   Rare dyspareunia     She has done an iron infusion since she has very heavy.  She has clots as well   the cycles are painful.    She is no longer interested in getting pregnant    .   HPI      GYN & OB History  Patient's last menstrual period was 2025 (approximate).     Date of Last Pap: 2022    OB History    Para Term  AB Living   2 1       SAB IAB Ectopic Multiple Live Births             # Outcome Date GA Lbr Alex/2nd Weight Sex Type Anes PTL Lv   2             1 Para                Review of Systems  Review of Systems   Genitourinary:  Positive for dysmenorrhea and menorrhagia.             Objective:    Physical Exam:   Constitutional: She appears well-developed.       Cardiovascular:  Normal rate and regular rhythm.             Pulmonary/Chest: Effort normal.        Abdominal: Soft.     Genitourinary:    Vagina, right adnexa and left adnexa normal.      Pelvic exam was performed with patient supine.   Cervix is normal. There is rectocele (to the introitus) in the vagina. Uterus is enlarged.                        Assessment:        1. Encounter for screening for cervical cancer    2. Dysmenorrhea    3. Menorrhagia with regular cycle    4. Rectocele                 Plan:           Will do a TLH and rectocele and cysto at [facility]    Pap preformed    discussed will return for her annual     Pt is aware we call all results. If she does not hear from our office regarding her result within a week of having a study or procedure performed she is to call the office so that we can  research the result for her.  Birth control discussed  Chaperone was present

## 2025-03-13 LAB — Lab: NORMAL

## 2025-03-17 ENCOUNTER — RESULTS FOLLOW-UP (OUTPATIENT)
Dept: OBSTETRICS AND GYNECOLOGY | Facility: CLINIC | Age: 40
End: 2025-03-17

## 2025-03-17 DIAGNOSIS — B96.89 BV (BACTERIAL VAGINOSIS): Primary | ICD-10-CM

## 2025-03-17 DIAGNOSIS — N76.0 BV (BACTERIAL VAGINOSIS): Primary | ICD-10-CM

## 2025-03-17 RX ORDER — TINIDAZOLE 500 MG/1
TABLET ORAL
Qty: 8 TABLET | Refills: 0 | Status: SHIPPED | OUTPATIENT
Start: 2025-03-17 | End: 2025-04-14

## 2025-04-04 ENCOUNTER — PATIENT MESSAGE (OUTPATIENT)
Dept: OBSTETRICS AND GYNECOLOGY | Facility: CLINIC | Age: 40
End: 2025-04-04
Payer: COMMERCIAL

## 2025-04-07 DIAGNOSIS — B00.9 HERPES: Primary | ICD-10-CM

## 2025-04-07 RX ORDER — ACYCLOVIR 800 MG/1
TABLET ORAL
Qty: 45 TABLET | Refills: 2 | Status: SHIPPED | OUTPATIENT
Start: 2025-04-07

## 2025-04-14 ENCOUNTER — OFFICE VISIT (OUTPATIENT)
Dept: OBSTETRICS AND GYNECOLOGY | Facility: CLINIC | Age: 40
End: 2025-04-14
Payer: COMMERCIAL

## 2025-04-14 VITALS
BODY MASS INDEX: 36.4 KG/M2 | WEIGHT: 186.38 LBS | SYSTOLIC BLOOD PRESSURE: 122 MMHG | DIASTOLIC BLOOD PRESSURE: 78 MMHG | HEART RATE: 89 BPM

## 2025-04-14 DIAGNOSIS — N94.6 DYSMENORRHEA: Primary | ICD-10-CM

## 2025-04-14 DIAGNOSIS — N81.6 RECTOCELE: ICD-10-CM

## 2025-04-14 DIAGNOSIS — N92.0 MENORRHAGIA WITH REGULAR CYCLE: ICD-10-CM

## 2025-04-14 PROCEDURE — 99213 OFFICE O/P EST LOW 20 MIN: CPT | Mod: S$PBB,,, | Performed by: OBSTETRICS & GYNECOLOGY

## 2025-04-14 RX ORDER — PROMETHAZINE HYDROCHLORIDE 12.5 MG/1
12.5 TABLET ORAL EVERY 6 HOURS PRN
Qty: 8 TABLET | Refills: 1 | Status: SHIPPED | OUTPATIENT
Start: 2025-04-14

## 2025-04-14 RX ORDER — OXYCODONE AND ACETAMINOPHEN 5; 325 MG/1; MG/1
1 TABLET ORAL EVERY 4 HOURS PRN
Qty: 18 TABLET | Refills: 0 | Status: SHIPPED | OUTPATIENT
Start: 2025-04-14

## 2025-04-14 RX ORDER — IBUPROFEN 600 MG/1
600 TABLET ORAL 3 TIMES DAILY
Qty: 18 TABLET | Refills: 1 | Status: SHIPPED | OUTPATIENT
Start: 2025-04-14

## 2025-04-14 NOTE — PROGRESS NOTES
Subjective  Patient ID: Kayla New is a 39 y.o. female.     Chief Complaint:  Consult (Patient would like to discuss surgery. )        History of Present Illness  She is doing well.  No new health issues,  No abnormal bleeding, she  is splinting with every BM now.  She has a small bladder  no OSCAR   she does not pee when she power lifts  ,  her  left the relationship they are .   Rare dyspareunia     She has done an iron infusion since she has very heavy.  She has clots as well   the cycles are painful.    She is no longer interested in getting pregnant    Here to discuss surgery   she did have to get an iron infusion    she is finding the rectocele is becoming more difficult    .   HPI        GYN & OB History  Patient's last menstrual period was 2025 (approximate).      Date of Last Pap: 2022                       OB History    Para Term  AB Living    2 1            SAB IAB Ectopic Multiple Live Births                           # Outcome Date GA Lbr Alex/2nd Weight Sex Type Anes PTL Lv   2                      1 Para                           Review of Systems  Review of Systems   Genitourinary:  Positive for dysmenorrhea and menorrhagia.               Objective:      Objective  Physical Exam:   Constitutional: She appears well-developed.       Cardiovascular:  Normal rate and regular rhythm.             Pulmonary/Chest: Effort normal.         Abdominal: Soft.     Genitourinary:    Vagina, right adnexa and left adnexa normal.      Pelvic exam was performed with patient supine.   Cervix is normal. There is rectocele (to the introitus) in the vagina. Uterus is enlarged.                       Assessment:      Assessment  1. Symptomatic anemia    2. Dysmenorrhea    3. Menorrhagia with regular cycle    4. Rectocele                   Plan:      Plan     Will do a TL and rectocele and cysto at [facility]

## 2025-04-23 ENCOUNTER — OUTSIDE PLACE OF SERVICE (OUTPATIENT)
Dept: OBSTETRICS AND GYNECOLOGY | Facility: CLINIC | Age: 40
End: 2025-04-23
Payer: COMMERCIAL

## 2025-04-23 ENCOUNTER — OUTSIDE PLACE OF SERVICE (OUTPATIENT)
Dept: OBSTETRICS AND GYNECOLOGY | Facility: CLINIC | Age: 40
End: 2025-04-23

## 2025-04-23 DIAGNOSIS — K59.00 CONSTIPATION, UNSPECIFIED CONSTIPATION TYPE: Primary | ICD-10-CM

## 2025-04-23 RX ORDER — DOCUSATE SODIUM 100 MG/1
100 CAPSULE, LIQUID FILLED ORAL 2 TIMES DAILY
Qty: 60 CAPSULE | Refills: 1 | Status: SHIPPED | OUTPATIENT
Start: 2025-04-23 | End: 2026-04-23

## 2025-04-24 ENCOUNTER — NURSE TRIAGE (OUTPATIENT)
Dept: ADMINISTRATIVE | Facility: CLINIC | Age: 40
End: 2025-04-24
Payer: COMMERCIAL

## 2025-04-24 ENCOUNTER — OUTSIDE PLACE OF SERVICE (OUTPATIENT)
Dept: OBSTETRICS AND GYNECOLOGY | Facility: CLINIC | Age: 40
End: 2025-04-24
Payer: COMMERCIAL

## 2025-04-24 ENCOUNTER — PATIENT MESSAGE (OUTPATIENT)
Dept: OBSTETRICS AND GYNECOLOGY | Facility: CLINIC | Age: 40
End: 2025-04-24
Payer: COMMERCIAL

## 2025-04-24 DIAGNOSIS — N32.89 BLADDER SPASM: Primary | ICD-10-CM

## 2025-04-24 RX ORDER — DIAZEPAM 5 MG/1
5 TABLET ORAL EVERY 8 HOURS
Qty: 21 TABLET | Refills: 0 | Status: SHIPPED | OUTPATIENT
Start: 2025-04-24

## 2025-04-24 NOTE — PROGRESS NOTES
Pt with a hx of chronic constipation which contributed to formation of a rectocele that was repaired surgically yesterday. Pt has tried all over the counter options  including metamucil, senokot, increased fiber, and is currently on colace and miralax which is not effective enough.

## 2025-04-24 NOTE — TELEPHONE ENCOUNTER
"Partial hysterectomy yesterday as well well as a rectocele repair    Reports that she still feels like it is bulging out of the vagina. Can't see anything, just feels like a buldge in vaginal area. Did not notice until she was discharged today      Dispo is call transferred to PCP now    Attempted to reach OBGYN office. Unable to reach provider's office. Secure chat messaged OBGYN Dr. Porter who performed surgery.     Dr. Porter advised, "I am out of town but please reassure her. Just confirm that the packing came out. The nurse removed it last night. She likely has some swelling but as long as she is voiding she should be fine. The swelling is going to resolve. If she needs to be seen Dr baugh saw her this am for me and can likely work her in today or tomorrow."    I added Dr. Baugh to [facility] secure chat to inform her of pt situation as well. In meantime, pt told me that she is having to force herself to urinate, feeling like she is pushing to urinate.     Advised MD's of this.     Dr. Baugh advised, "We can call and check on her as well. the first thing she said this am was how she is urinating great and had gone 8 times overnight. She may be getting some bladder spasms but we can call her too here in a few minutes. We dont want her straining."    Did relay this information to patient. Also advised pt to keep phone on loud that MD will reach out to her shortly to discuss. Provided pt with callback s/s. Pt VU.       Reason for Disposition   Caller has URGENT question and triager unable to answer question    Additional Information   Negative: Sounds like a life-threatening emergency to the triager   Negative: Bright red, wide-spread, sunburn-like rash   Negative: SEVERE headache (e.g., excruciating) and after spinal (epidural) anesthesia   Negative: Vomiting and persists > 4 hours   Negative: Vomiting and abdomen looks much more swollen than usual   Negative: Drinking very little and dehydration suspected (e.g., no " urine > 12 hours, very dry mouth, very lightheaded)   Negative: Patient sounds very sick or weak to the triager   Negative: Sounds like a serious complication to the triager   Negative: Fever > 100.4 F (38.0 C)    Protocols used: Post-Op Symptoms and [id number]

## 2025-04-24 NOTE — PROGRESS NOTES
Pt called regarding her concern about straining to urinate and her linzess rx. She feels like the post vaginal wall still has a bulge but discussed she has several layers of suture and there is going to be some swelling for a while that can feel like a bulge until it heals and swelling goes down.  Was doing well with urination but was given valium for bladder spasm in the hospital so will send that out to see if that will relieve it as we dont want her straining. She is feeling sensation that she needs to have a bm but has taken colace and miralax since she got home and will do bid for now. Linzess pa sent at 730 this am and pharmacy had wrong insurance so has been resent, the pa was redone and the insurance was also called this am and advised us that it could take up to 24 hours for a decision.  They just sent us a message requesting clinical notes which will be sent as well.  Advised pt of this and will update her as we know more but in the meantime to continue the bid miralax and colace and stay hydrated

## 2025-04-25 ENCOUNTER — PATIENT MESSAGE (OUTPATIENT)
Dept: OBSTETRICS AND GYNECOLOGY | Facility: CLINIC | Age: 40
End: 2025-04-25
Payer: COMMERCIAL

## 2025-04-25 ENCOUNTER — TELEPHONE (OUTPATIENT)
Dept: OBSTETRICS AND GYNECOLOGY | Facility: CLINIC | Age: 40
End: 2025-04-25
Payer: COMMERCIAL

## 2025-04-25 DIAGNOSIS — K59.00 CONSTIPATION, UNSPECIFIED CONSTIPATION TYPE: Primary | ICD-10-CM

## 2025-04-25 DIAGNOSIS — K59.09 CHRONIC CONSTIPATION: ICD-10-CM

## 2025-04-25 RX ORDER — PLECANATIDE 3 MG/1
3 TABLET ORAL DAILY
Qty: 30 TABLET | Refills: 3 | Status: SHIPPED | OUTPATIENT
Start: 2025-04-25

## 2025-04-25 NOTE — TELEPHONE ENCOUNTER
Spoke with pt. She states she's not sure if she has strep or tonsil stones as she gets those often. She's going to get evaluated at urgent care. She wasn't sure if she could take antibiotics but I told her it was fine since she is not on any other antibiotics only pain medication. She understood.

## 2025-04-25 NOTE — TELEPHONE ENCOUNTER
----- Message from Kiki sent at 4/25/2025 10:03 AM CDT -----  Contact: Suellen  .Type:  Needs Medical AdviceWho Called:  suellen Symptoms (please be specific):  recent surgery on 4/23. Suspecting she has strep throat and asking if antibiotics would interact with current meds. States tonsils have a bunch of spots on them How long has patient had these symptoms:   1 day Pharmacy name and phone #:   .[facility]/pharmacy #[id number] - [place], [place] - [id number] [id number]: [phone] Fax: [phone] the patient rather a call back or a response via [facility]?  Call Best Call Back Number: .[phone] Additional Information:  MRN: [id number]

## 2025-04-25 NOTE — TELEPHONE ENCOUNTER
Called and spoke with pt. She is passing gas. She states she cannot do milk of magnesia because she will throw up. I told her hopefully the linzess will be approved by the end of the day today. She said she may try some metamucil or benefiber. I told her to let us know if she needs anything else. She is scheduled for her post op appointment Tuesday.

## 2025-04-25 NOTE — TELEPHONE ENCOUNTER
We made her pa urgent but they are still dragging their feet.  I sent clinical notes and wrote an additional note and sent that in right after lunch yesterday and we just called them to check and they said it can take them up to 24 hours to receive a fax and add it to her pa (but they dont see it yet), fax number verified and correct and said to call back in a couple hrs to recheck.  Yesterday before they even asked for the additional records, they said it could take 24 hrs for a decision once everything received. So nothing is moving quickly on their end, but we have been bugging them multiple times a day to stay on them. I agree with 3x a day miralax and she can also up the colace to 3x a day if needed,but would take more than that on those.  Senokot helps some patients as well. I would avoid a suppository bc of the the stitches from rectocele repair and the swelling would make that uncomfortable. If nothing happens by tomorrow she can always take some milk of magnesia but that can cause more cramping than what she is on now, so I would give these things a little more time thru today. Part of what she is feeling that makes her feel like she needs to have a bm is the swelling/ pressure in the rectum from the rectocele repair itself. Pts will feel like they need to have a bm whether they need to or not once they wake up from surgery. But yes we do want her to have one in next day or so.  Is she is passing gas well ? - if so that is a good sign things are moving. Make sure she is staying hydrated too.  If she wants to try a little benefiber or metamucil she can but dont go too crazy on the fiber bc taking in a lot of fiber at once can give her more gas which can be uncomfortable these first few days post op.

## 2025-04-29 ENCOUNTER — OFFICE VISIT (OUTPATIENT)
Dept: OBSTETRICS AND GYNECOLOGY | Facility: CLINIC | Age: 40
End: 2025-04-29
Payer: COMMERCIAL

## 2025-04-29 VITALS
HEART RATE: 93 BPM | SYSTOLIC BLOOD PRESSURE: 120 MMHG | WEIGHT: 184.63 LBS | BODY MASS INDEX: 36.05 KG/M2 | DIASTOLIC BLOOD PRESSURE: 83 MMHG

## 2025-04-29 DIAGNOSIS — Z98.890 POST-OPERATIVE STATE: Primary | ICD-10-CM

## 2025-04-29 PROCEDURE — 99024 POSTOP FOLLOW-UP VISIT: CPT | Mod: ,,, | Performed by: OBSTETRICS & GYNECOLOGY

## 2025-04-29 NOTE — PROGRESS NOTES
Subjective:       Patient ID: Kayla New is a 39 y.o. female.    Chief Complaint:  Post-op Evaluation (1 week post op. She is having some discomfort with trying to urinate. She did see Dr Mendez while Dr Porter was out and she was given diazepam to help with this and also taking miralax, colace, and trulance for constipation. )      History of Present Illness  She is doing well.  No new health issues,  No abnormal bleeding, No GI or Gu concerns,  No dyspareunia  Status post .  TLH bilateral salpingectomy and rectocele repair.  She is actually doing very well today she is smiling she appears to have excellent pain control she is taking Trulance to help with her bowels and although she has some swelling she admits to overall she is doing very well.  She is voiding as well      GYN & OB History  Patient's last menstrual period was 2025.     Date of Last Pap: 3/13/2025    OB History    Para Term  AB Living   2 1       SAB IAB Ectopic Multiple Live Births             # Outcome Date GA Lbr Alex/2nd Weight Sex Type Anes PTL Lv   2             1 Para                Review of Systems  Review of Systems          Objective:    Physical Exam:   Constitutional: She is oriented to person, place, and time. She appears well-developed and well-nourished.        Pulmonary/Chest: Effort normal.        Abdominal: Soft.   Incision inspected and are healing nicely suture removed as indicated                  Neurological: She is alert and oriented to person, place, and time.    Skin: Skin is warm.    Psychiatric: She has a normal mood and affect. Her behavior is normal. Judgment and thought content normal.          Assessment:        1. Post-operative state                 Plan:           She is to follow up in 5 weeks she is to avoid heavy lifting and to return sooner if needed.  Her pathology was discussed with her regarding the adenomyosis as well as her pictures were gone over.  Pt is aware we call  all results. If she does not hear from our office regarding her result within a week of having a study or procedure performed she is to call the office so that we can research the result for her.  Normal activities discussed.   She is to return for any reason.      Chaperone was present

## 2025-05-20 ENCOUNTER — PATIENT MESSAGE (OUTPATIENT)
Dept: OBSTETRICS AND GYNECOLOGY | Facility: CLINIC | Age: 40
End: 2025-05-20
Payer: COMMERCIAL

## 2025-05-22 ENCOUNTER — OFFICE VISIT (OUTPATIENT)
Dept: OBSTETRICS AND GYNECOLOGY | Facility: CLINIC | Age: 40
End: 2025-05-22
Payer: COMMERCIAL

## 2025-05-22 VITALS
BODY MASS INDEX: 35.62 KG/M2 | DIASTOLIC BLOOD PRESSURE: 80 MMHG | WEIGHT: 182.38 LBS | SYSTOLIC BLOOD PRESSURE: 113 MMHG | HEART RATE: 89 BPM

## 2025-05-22 DIAGNOSIS — N30.90 CYSTITIS: ICD-10-CM

## 2025-05-22 DIAGNOSIS — Z98.890 POST-OPERATIVE STATE: Primary | ICD-10-CM

## 2025-05-22 RX ORDER — NITROFURANTOIN 25; 75 MG/1; MG/1
100 CAPSULE ORAL 2 TIMES DAILY
Qty: 14 CAPSULE | Refills: 0 | Status: SHIPPED | OUTPATIENT
Start: 2025-05-22 | End: 2025-05-29

## 2025-05-22 NOTE — PROGRESS NOTES
Subjective:       Patient ID: Kayla New is a 39 y.o. female.    Chief Complaint:  Post-op Evaluation (4 WEEK POST OP. 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 SHE FEELS CRAMPS FROM TIME TO TIME AS WELL. SHE DOES FEEL HER SYMPTOMS ARE BETTER TODAY. )      History of Present Illness  She is doing well.  No new health issues,  No abnormal bleeding, No GI or Gu concerns,  No dyspareunia  Status post  TLH and rectocele repair   she noted some  pressure.   She is not doing heavy lifting.   She is doing ok otherwise          GYN & OB History  Patient's last menstrual period was 2025.     Date of Last Pap: 3/13/2025    OB History    Para Term  AB Living   2 1       SAB IAB Ectopic Multiple Live Births             # Outcome Date GA Lbr Alex/2nd Weight Sex Type Anes PTL Lv   2             1 Para                Review of Systems  Review of Systems          Objective:    Physical Exam:   Constitutional: She is oriented to person, place, and time. She appears well-developed.             Abdominal: Soft.   Incisions dry and intact     Genitourinary:    Vagina normal.      Pelvic exam was performed with patient supine.     Labial bartholins normal.There is no tenderness or lesion on the right labia. There is no tenderness or lesion on the left labia. Cervix is absent.Uterus is absent. Bladder findings: bladder tenderness     Genitourinary Comments: The vaginal cuff is intact and healing nicely                 Neurological: She is alert and oriented to person, place, and time. She has normal reflexes.     Psychiatric: She has a normal mood and affect. Her speech is normal and behavior is normal.          Assessment:        1. Post-operative state    2. Cystitis                 Plan:             Pap discussed will return for her annual     Pt is aware we call all results. If she does not hear from our office regarding her result within  a week of having a study or procedure performed she is to call the office so that we can research the result for her.  Normal activities discussed.   She is to return for any reason.      Chaperone was present

## 2025-06-03 ENCOUNTER — OFFICE VISIT (OUTPATIENT)
Dept: OBSTETRICS AND GYNECOLOGY | Facility: CLINIC | Age: 40
End: 2025-06-03
Payer: COMMERCIAL

## 2025-06-03 VITALS
SYSTOLIC BLOOD PRESSURE: 119 MMHG | HEART RATE: 73 BPM | WEIGHT: 180.81 LBS | BODY MASS INDEX: 35.31 KG/M2 | DIASTOLIC BLOOD PRESSURE: 85 MMHG

## 2025-06-03 DIAGNOSIS — Z98.890 POST-OPERATIVE STATE: Primary | ICD-10-CM

## 2025-06-10 ENCOUNTER — PATIENT MESSAGE (OUTPATIENT)
Dept: OBSTETRICS AND GYNECOLOGY | Facility: CLINIC | Age: 40
End: 2025-06-10
Payer: COMMERCIAL

## 2025-06-10 DIAGNOSIS — B00.9 HERPES: ICD-10-CM

## 2025-06-10 RX ORDER — ACYCLOVIR 800 MG/1
TABLET ORAL
Qty: 45 TABLET | Refills: 2 | Status: SHIPPED | OUTPATIENT
Start: 2025-06-10

## 2025-06-13 ENCOUNTER — PATIENT MESSAGE (OUTPATIENT)
Dept: OBSTETRICS AND GYNECOLOGY | Facility: CLINIC | Age: 40
End: 2025-06-13
Payer: COMMERCIAL

## 2025-06-16 DIAGNOSIS — B00.9 HERPES: ICD-10-CM

## 2025-06-16 RX ORDER — ACYCLOVIR 800 MG/1
TABLET ORAL
Qty: 60 TABLET | Refills: 2 | Status: SHIPPED | OUTPATIENT
Start: 2025-06-16

## 2025-06-24 ENCOUNTER — PATIENT MESSAGE (OUTPATIENT)
Dept: OBSTETRICS AND GYNECOLOGY | Facility: CLINIC | Age: 40
End: 2025-06-24
Payer: COMMERCIAL